# Patient Record
Sex: MALE | Race: WHITE
[De-identification: names, ages, dates, MRNs, and addresses within clinical notes are randomized per-mention and may not be internally consistent; named-entity substitution may affect disease eponyms.]

---

## 2020-05-02 ENCOUNTER — HOSPITAL ENCOUNTER (EMERGENCY)
Dept: HOSPITAL 47 - EC | Age: 45
Discharge: HOME | End: 2020-05-02
Payer: COMMERCIAL

## 2020-05-02 VITALS
SYSTOLIC BLOOD PRESSURE: 150 MMHG | DIASTOLIC BLOOD PRESSURE: 92 MMHG | TEMPERATURE: 98 F | RESPIRATION RATE: 17 BRPM | HEART RATE: 91 BPM

## 2020-05-02 DIAGNOSIS — F17.200: ICD-10-CM

## 2020-05-02 DIAGNOSIS — S01.511A: ICD-10-CM
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
## 2020-05-02 DIAGNOSIS — S02.5XXA: Primary | ICD-10-CM

## 2020-05-02 PROCEDURE — 99283 EMERGENCY DEPT VISIT LOW MDM: CPT
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
## 2021-01-05 ENCOUNTER — HOSPITAL ENCOUNTER (EMERGENCY)
Dept: HOSPITAL 47 - EC | Age: 46
Discharge: HOME | End: 2021-01-05
Payer: COMMERCIAL

## 2021-01-05 VITALS — SYSTOLIC BLOOD PRESSURE: 110 MMHG | DIASTOLIC BLOOD PRESSURE: 83 MMHG | HEART RATE: 58 BPM | RESPIRATION RATE: 16 BRPM

## 2021-01-05 VITALS — TEMPERATURE: 98.4 F

## 2021-01-05 DIAGNOSIS — M94.0: Primary | ICD-10-CM

## 2021-01-05 DIAGNOSIS — F17.200: ICD-10-CM

## 2021-01-05 LAB
ALBUMIN SERPL-MCNC: 4.5 G/DL (ref 3.5–5)
ALP SERPL-CCNC: 71 U/L (ref 38–126)
ALT SERPL-CCNC: 34 U/L (ref 4–49)
ANION GAP SERPL CALC-SCNC: 6 MMOL/L
APTT BLD: 24.5 SEC (ref 22–30)
AST SERPL-CCNC: 29 U/L (ref 17–59)
BASOPHILS # BLD AUTO: 0.1 K/UL (ref 0–0.2)
BASOPHILS NFR BLD AUTO: 1 %
BUN SERPL-SCNC: 20 MG/DL (ref 9–20)
CALCIUM SPEC-MCNC: 9.4 MG/DL (ref 8.4–10.2)
CHLORIDE SERPL-SCNC: 111 MMOL/L (ref 98–107)
CK SERPL-CCNC: 233 U/L (ref 55–170)
CO2 SERPL-SCNC: 23 MMOL/L (ref 22–30)
D DIMER PPP FEU-MCNC: <0.17 MG/L FEU (ref ?–0.6)
EOSINOPHIL # BLD AUTO: 0.5 K/UL (ref 0–0.7)
EOSINOPHIL NFR BLD AUTO: 5 %
ERYTHROCYTE [DISTWIDTH] IN BLOOD BY AUTOMATED COUNT: 4.89 M/UL (ref 4.3–5.9)
ERYTHROCYTE [DISTWIDTH] IN BLOOD: 12.6 % (ref 11.5–15.5)
GLUCOSE SERPL-MCNC: 100 MG/DL (ref 74–99)
HCT VFR BLD AUTO: 45.2 % (ref 39–53)
HGB BLD-MCNC: 15.4 GM/DL (ref 13–17.5)
INR PPP: 0.9 (ref ?–1.2)
LIPASE SERPL-CCNC: 79 U/L (ref 23–300)
LYMPHOCYTES # SPEC AUTO: 2.8 K/UL (ref 1–4.8)
LYMPHOCYTES NFR SPEC AUTO: 26 %
MAGNESIUM SPEC-SCNC: 2 MG/DL (ref 1.6–2.3)
MCH RBC QN AUTO: 31.4 PG (ref 25–35)
MCHC RBC AUTO-ENTMCNC: 34 G/DL (ref 31–37)
MCV RBC AUTO: 92.5 FL (ref 80–100)
MONOCYTES # BLD AUTO: 0.5 K/UL (ref 0–1)
MONOCYTES NFR BLD AUTO: 5 %
NEUTROPHILS # BLD AUTO: 6.8 K/UL (ref 1.3–7.7)
NEUTROPHILS NFR BLD AUTO: 63 %
PLATELET # BLD AUTO: 288 K/UL (ref 150–450)
POTASSIUM SERPL-SCNC: 4.3 MMOL/L (ref 3.5–5.1)
PROT SERPL-MCNC: 7.5 G/DL (ref 6.3–8.2)
PT BLD: 9.6 SEC (ref 9–12)
SODIUM SERPL-SCNC: 140 MMOL/L (ref 137–145)
WBC # BLD AUTO: 10.8 K/UL (ref 3.8–10.6)

## 2021-01-05 PROCEDURE — 84484 ASSAY OF TROPONIN QUANT: CPT

## 2021-01-05 PROCEDURE — 85379 FIBRIN DEGRADATION QUANT: CPT

## 2021-01-05 PROCEDURE — 36415 COLL VENOUS BLD VENIPUNCTURE: CPT

## 2021-01-05 PROCEDURE — 83880 ASSAY OF NATRIURETIC PEPTIDE: CPT

## 2021-01-05 PROCEDURE — 82550 ASSAY OF CK (CPK): CPT

## 2021-01-05 PROCEDURE — 83690 ASSAY OF LIPASE: CPT

## 2021-01-05 PROCEDURE — 93005 ELECTROCARDIOGRAM TRACING: CPT

## 2021-01-05 PROCEDURE — 85610 PROTHROMBIN TIME: CPT

## 2021-01-05 PROCEDURE — 83735 ASSAY OF MAGNESIUM: CPT

## 2021-01-05 PROCEDURE — 80053 COMPREHEN METABOLIC PANEL: CPT

## 2021-01-05 PROCEDURE — 99285 EMERGENCY DEPT VISIT HI MDM: CPT

## 2021-01-05 PROCEDURE — 85025 COMPLETE CBC W/AUTO DIFF WBC: CPT

## 2021-01-05 PROCEDURE — 85730 THROMBOPLASTIN TIME PARTIAL: CPT

## 2021-01-05 PROCEDURE — 71046 X-RAY EXAM CHEST 2 VIEWS: CPT

## 2021-01-05 NOTE — ED
Chest Pain HPI





- General


Chief Complaint: Chest Pain


Stated Complaint: Chest Pain, Numbness


Time Seen by Provider: 01/05/21 17:26


Source: patient, RN notes reviewed


Mode of arrival: wheelchair


Limitations: no limitations





- History of Present Illness


Initial Comments: 





This is a 45-year-old male with no personal history of heart disease but 

positive family history and his age group who states she's had left-sided chest 

pain since [place] day.  He states is about 5/10 in severity right now at the 

scene the radiated up his neck is worse with certain movements and positional 

changes no fevers chills nausea vomiting sweats cough or phlegm production the 

patient does admit to being a smoker.  He has work .


MD Complaint: chest pain





- Related Data


                                Home Medications











 Medication  Instructions  Recorded  Confirmed


 


No Known Home Medications  01/05/21 01/05/21











                                    Allergies











Allergy/AdvReac Type Severity Reaction Status Date / Time


 


No Known Allergies Allergy   Verified 01/05/21 18:25














Review of Systems


ROS Statement: 


Those systems with pertinent positive or pertinent negative responses have been 

documented in the HPI.





ROS Other: All systems not noted in ROS Statement are negative.





EKG Findings





- EKG Results:


EKG: interpreted by KELLIE, sinus rhythm (Sinus bradycardia rate of 56.  Interval 

162  QT since /378 no acute ST-T wave changes)





Past Medical History


Past Medical History: No Reported History


History of Any Multi-Drug Resistant Organisms: None Reported


Past Surgical History: Orthopedic Surgery


Additional Past Surgical History / Comment(s): hip left


Past Psychological History: No Psychological Hx Reported


Past Alcohol Use History: Occasional


Past Drug Use History: None Reported





General Exam





- General Exam Comments


Initial Comments: 





This is a well-developed well-nourished awake alert oriented times 3 male


Limitations: no limitations


General appearance: alert, in no apparent distress


Head exam: Present: atraumatic, normocephalic, normal inspection


Eye exam: Present: normal appearance, PERRL, EOMI.  Absent: scleral icterus, 

conjunctival injection, periorbital swelling


ENT exam: Present: normal exam, mucous membranes moist


Neck exam: Present: normal inspection, full ROM, other (No stridor JVD or bruits

some mild tennis palpation of the lateral left neck musculature.  Does reproduce

some of the patient's pain.).  Absent: tenderness, meningismus, lymphadenopathy


Respiratory exam: Present: normal lung sounds bilaterally.  Absent: respiratory 

distress, wheezes, rales, rhonchi, stridor


Cardiovascular Exam: Present: regular rate, normal rhythm, normal heart sounds. 

Absent: systolic murmur, diastolic murmur, rubs, gallop, clicks


GI/Abdominal exam: Present: soft, normal bowel sounds.  Absent: distended, 

tenderness, guarding, rebound, rigid


Extremities exam: Present: normal inspection, full ROM, normal capillary refill.

 Absent: tenderness, pedal edema, joint swelling, calf tenderness


Back exam: Present: normal inspection


Neurological exam: Present: alert, oriented X3, CN II-XII intact


Psychiatric exam: Present: normal affect, normal mood


Skin exam: Present: warm, dry, intact, normal color.  Absent: rash





Course


                                   Vital Signs











  01/05/21





  17:18


 


Temperature 98.4 F


 


Pulse Rate 67


 


Respiratory 18





Rate 


 


Blood Pressure 136/80


 


O2 Sat by Pulse 98





Oximetry 














Chest Pain MDM





- MDM





I did review the imaging and report no acute findings.  Patient presentation is 

consistent with costochondritis we did have a long discussion regarding is also 

smoking.  Patient will follow-up with his doctor he did tell me that he uses a 

CPAP machine at night.  He's had no problems with that recently.





Disposition


Clinical Impression: 


 Costochondritis, Chest wall syndrome





Disposition: HOME SELF-CARE


Condition: Good


Instructions (If sedation given, give patient instructions):  Costochondritis 

(ED)


Additional Instructions: 


Over-the-counter ibuprofen as discussed update 100 mg every 6-8 hours when 

necessary.  Follow-up with her doctor I did recommend a outpatient stress test.


Is patient prescribed a controlled substance at d/c from ED?: No


Referrals: 


Dominic De Jesus DO [Primary Care Provider] - 1-2 days

## 2021-01-05 NOTE — XR
EXAMINATION TYPE: XR chest 2V

 

DATE OF EXAM: 1/5/2021

 

COMPARISON: NONE

 

HISTORY: Chest pain

 

TECHNIQUE: 2 views

 

FINDINGS: Heart and mediastinum are normal. Lungs are clear. Diaphragm is normal. Bony thorax appears
 normal.

 

IMPRESSION: Normal chest. Normal heart.

## 2023-09-01 ENCOUNTER — HOSPITAL ENCOUNTER (OUTPATIENT)
Dept: HOSPITAL 47 - RADXRYALE | Age: 48
Discharge: HOME | End: 2023-09-01
Attending: FAMILY MEDICINE
Payer: COMMERCIAL

## 2023-09-01 DIAGNOSIS — M25.522: Primary | ICD-10-CM

## 2023-09-01 NOTE — XR
EXAMINATION TYPE: XR elbow complete LT

 

DATE OF EXAM: 9/1/2023 4:33 PM

 

INDICATION: 

Patient age:Male;  48 years old; 

Reason for study: [id number] LT ELBOW PAIN; [facility]. 

 

COMPARISON: None

 

TECHNIQUE: The left elbow was examined in AP, lateral, and oblique projections. 

 

FINDINGS: No evidence of any acute osseous pathology, joint dislocation, or soft tissue swelling is n
oted. Tiny olecranon enthesophyte at the insertion of the triceps tendon.  No evidence of joint effus
ion is present.

 

IMPRESSION:

No evidence of acute fracture.